# Patient Record
Sex: FEMALE | Race: BLACK OR AFRICAN AMERICAN | NOT HISPANIC OR LATINO | URBAN - METROPOLITAN AREA
[De-identification: names, ages, dates, MRNs, and addresses within clinical notes are randomized per-mention and may not be internally consistent; named-entity substitution may affect disease eponyms.]

---

## 2024-06-28 ENCOUNTER — TELEPHONE (OUTPATIENT)
Dept: PEDIATRICS | Facility: CLINIC | Age: 34
End: 2024-06-28

## 2024-06-28 NOTE — TELEPHONE ENCOUNTER
Reached out no answer, unable to leave voicemail.    ----- Message from Rose Carlin sent at 6/28/2024  8:19 AM CDT -----  Type: Needs Medical Advice  Who Called:  pt mother  Best Call Back Number: [phone]    Additional Information: Pt mother is trying to schedule an appt for Ofelia kohler new born appt born [birth year].Please call back and advise.